# Patient Record
Sex: MALE | Race: WHITE | URBAN - METROPOLITAN AREA
[De-identification: names, ages, dates, MRNs, and addresses within clinical notes are randomized per-mention and may not be internally consistent; named-entity substitution may affect disease eponyms.]

---

## 2019-03-14 ENCOUNTER — TELEPHONE (OUTPATIENT)
Dept: FAMILY MEDICINE | Facility: CLINIC | Age: 34
End: 2019-03-14

## 2019-03-14 NOTE — TELEPHONE ENCOUNTER
----- Message from Norma Noble sent at 3/13/2019  4:36 PM CDT -----  Contact: self   Patient need to speak to nurse regarding medication is was recently prescribed      Please call to advice [phone]      [facility] [place] [address] in [place]

## 2023-08-06 NOTE — TELEPHONE ENCOUNTER
Pt has 2 charts. Message created in correct chart . --lp   You can access the FollowMyHealth Patient Portal offered by [facility] by registering at the following website: [email or web address]. By joining [employer]’s FollowMyHealth portal, you will also be able to view your health information using other applications (apps) compatible with our [email or web address]